# Patient Record
Sex: FEMALE | Race: WHITE | URBAN - METROPOLITAN AREA
[De-identification: names, ages, dates, MRNs, and addresses within clinical notes are randomized per-mention and may not be internally consistent; named-entity substitution may affect disease eponyms.]

---

## 2018-10-02 ENCOUNTER — TELEPHONE (OUTPATIENT)
Dept: ORTHOPEDIC SURGERY | Age: 53
End: 2018-10-02

## 2018-10-02 ENCOUNTER — OFFICE VISIT (OUTPATIENT)
Dept: ORTHOPEDIC SURGERY | Age: 53
End: 2018-10-02
Payer: COMMERCIAL

## 2018-10-02 VITALS
BODY MASS INDEX: 18.83 KG/M2 | WEIGHT: 139 LBS | SYSTOLIC BLOOD PRESSURE: 112 MMHG | HEIGHT: 72 IN | HEART RATE: 79 BPM | DIASTOLIC BLOOD PRESSURE: 77 MMHG

## 2018-10-02 DIAGNOSIS — M25.571 RIGHT ANKLE PAIN, UNSPECIFIED CHRONICITY: Primary | ICD-10-CM

## 2018-10-02 DIAGNOSIS — S93.401A SPRAIN OF RIGHT ANKLE, UNSPECIFIED LIGAMENT, INITIAL ENCOUNTER: ICD-10-CM

## 2018-10-02 PROCEDURE — L4361 PNEUMA/VAC WALK BOOT PRE OTS: HCPCS | Performed by: ORTHOPAEDIC SURGERY

## 2018-10-02 PROCEDURE — 99204 OFFICE O/P NEW MOD 45 MIN: CPT | Performed by: ORTHOPAEDIC SURGERY

## 2018-10-02 RX ORDER — OXYBUTYNIN CHLORIDE 5 MG/1
5 TABLET ORAL 3 TIMES DAILY
COMMUNITY

## 2018-10-02 NOTE — PROGRESS NOTES
Date:  10/2/2018    Name:  Jorge Trejo  Address:  [address] [place] [address]    :  1965      Age:   52 y.o.    SSN:  [id number]      Medical Record Number:  [id number]    Reason for Visit:    Chief Complaint    Injury (np right ankle pain/swelling. injured while hiking today.)      DOS:10/2/2018     HPI: Jorge Trejo is a 52 y.o. female here today for evaluation of right ankle injury. Earlier today she was hiking when she twisted her right ankle. Since that time she's had lateral sided ankle pain with associated swelling. It's difficult for her to weight-bear on her right ankle. There is no numbness or tingling in the right lower extremity. No history of prior traumas. Pain Assessment  Location of Pain: Ankle  Location Modifiers: Right  Severity of Pain: 5  Quality of Pain: Sharp, Dull  Duration of Pain: Persistent  Frequency of Pain: Constant  Date Pain First Started: 10/02/18  Aggravating Factors: Standing (moving it)  Limiting Behavior: Yes  Relieving Factors: Rest  Result of Injury: Yes  Work-Related Injury: No  Are there other pain locations you wish to document?: No  ROS: All systems reviewed on patient intake form. Pertinent items are noted in HPI. Past Medical History:   Diagnosis Date    Arthritis     Thyroid disease         No past surgical history on file. No family history on file.     Social History     Social History    Marital status:      Spouse name: N/A    Number of children: N/A    Years of education: N/A     Social History Main Topics    Smoking status: Never Smoker    Smokeless tobacco: Never Used    Alcohol use Yes    Drug use: No    Sexual activity: Not Asked     Other Topics Concern    None     Social History Narrative    None       Current Outpatient Prescriptions   Medication Sig Dispense Refill    Levothyroxine Sodium (SYNTHROID PO) Take by mouth      Linaclotide (LINZESS PO) Take by mouth      PANTOPRAZOLE SODIUM PO Take by mouth appointment was performed between the patient and Dr. Cort Hashimoto. 10/2/2018 2:02 PM      This dictation was performed with a verbal recognition program (DRAGON) and it was checked for errors. It is possible that there are still dictated errors within this office note. If so, please bring any errors to my attention for an addendum. All efforts were made to ensure that this office note is accurate.

## 2018-10-05 NOTE — PROGRESS NOTES
 DICLOFENAC PO Take by mouth      oxybutynin (DITROPAN) 5 MG tablet Take 5 mg by mouth 3 times daily       No current facility-administered medications for this visit. Allergies   Allergen Reactions    Sulfa Antibiotics Rash       Vital signs:  /77   Pulse 79   Ht 6' (1.829 m)   Wt 139 lb (63 kg)   BMI 18.85 kg/m²        Neuro: Alert & oriented x 3,  normal,  no focal deficits noted. Normal affect. Eyes: sclera clear  Ears: Normal external ear  Mouth:  No perioral lesions  Pulm: Respirations unlabored and regular  Pulse: Regular rate    Skin: Warm, well perfused      RIGHT ankle exam    Gait: No use of assistive devices. No antalgic gait. Inspection/skin: No apparent deformity or abnormality. No significant edema, or ecchymosis. Palpation: Tenderness over lateral malleolus. No medial sided tenderness. No syndesmotic tenderness. Nontender to palpation about the medial malleolus, base of the fifth metatarsal, proximal and distal medial metatarsals, tibial diaphysis. Nontender palpation along the insertion of the Achilles tendon. Range of Motion: Full ROM. Strength: No pain with resisted external rotation. 5 over 5 and symmetric strength in his EHL/FHL/GSC/TA. Effusion: No apparent effusion. Neurologic and vascular: The skin is warm and well perfused throughout. Sensation intact to light touch. Special Tests: Negative squeeze test.      LEFT comparison ankle exam    Gait: No use of assistive devices. No antalgic gait. Inspection/skin: No apparent deformity or abnormality. No significant edema, or ecchymosis. Palpation: Nontender to palpation about the medial andlateral malleolus, base of the fifth metatarsal, proximal and distal medial metatarsals, tibial diaphysis. Nontender palpation along the insertion of the Achilles tendon. Range of Motion: Full ROM. Strength: 5 over 5 and symmetric strength in his EHL/FHL/GSC/TA.       Effusion: No apparent

## 2018-10-12 ENCOUNTER — OFFICE VISIT (OUTPATIENT)
Dept: ORTHOPEDIC SURGERY | Age: 53
End: 2018-10-12
Payer: COMMERCIAL

## 2018-10-12 VITALS
SYSTOLIC BLOOD PRESSURE: 108 MMHG | BODY MASS INDEX: 18.81 KG/M2 | DIASTOLIC BLOOD PRESSURE: 70 MMHG | HEART RATE: 76 BPM | HEIGHT: 72 IN | WEIGHT: 138.89 LBS

## 2018-10-12 DIAGNOSIS — S82.891D CLOSED AVULSION FRACTURE OF RIGHT ANKLE WITH ROUTINE HEALING, SUBSEQUENT ENCOUNTER: Primary | ICD-10-CM

## 2018-10-12 PROCEDURE — 99214 OFFICE O/P EST MOD 30 MIN: CPT | Performed by: ORTHOPAEDIC SURGERY

## 2018-10-12 PROCEDURE — L4350 ANKLE CONTROL ORTHO PRE OTS: HCPCS | Performed by: ORTHOPAEDIC SURGERY

## 2018-10-13 NOTE — PROGRESS NOTES
Chief Complaint  Follow-up (right ankle pain. symptoms improved.)      History of Present Illness:  Farida Carpenter is a pleasant 52 y.o. female with 8 day old right ankle sprain and lateral avulsion fracture. She reports she's been compliant with boot. Her ankle doesn't cause much pain. She feels like she is doing much better. The swelling is improving, but still persists. She also has some bruising. No new injuries reported. Medical History:  Patient's medications, allergies, past medical, surgical, social and family histories were reviewed and updated as appropriate. Pertinent items are noted in HPI  Review of systems reviewed from Patient History Form dated on 10/12/2018 and available in the patient's chart under the Media tab. Vital Signs:  Vitals:    10/12/18 1032   BP: 108/70   Pulse: 76         Neuro: Alert & oriented x 3,  normal,  no focal deficits noted. Normal affect. Eyes: sclera clear  Ears: Normal external ear  Mouth:  No perioral lesions  Pulm: Respirations unlabored and regular  Pulse: Regular rate and rhythm   Skin: Warm, well perfused      Constitutional: In no apparent distress. Normal affect. Alert and oriented X3 and is cooperative. RIGHT ankle exam    Gait: No use of assistive devices. No antalgic gait. Inspection/skin: No apparent deformity or abnormality. Some edema and ecchymosis laterally. Palpation: Tenderness laterally. Nontender to palpation about the medial malleolus, base of the fifth metatarsal, proximal and distal medial metatarsals, tibial diaphysis. Nontender palpation along the insertion of the Achilles tendon. Range of Motion: Full ROM. Strength: good strength with inversion and eversion. Effusion: Mild effusion. Neurologic and vascular: The skin is warm and well perfused throughout. Sensation intact to light touch. LEFT ankle exam    Gait: No use of assistive devices. No antalgic gait.     Inspection/skin: No apparent deformity or abnormality. No significant edema, or ecchymosis. Palpation: Nontender to palpation about the medial and lateral malleolus, base of the fifth metatarsal, proximal and distal medial metatarsals, tibial diaphysis. Nontender palpation along the insertion of the Achilles tendon. Range of Motion: Full ROM. Strength: 5 over 5 and symmetric strength with eversion and inversion. Effusion: No apparent effusion. Neurologic and vascular: The skin is warm and well perfused throughout. Sensation intact to light touch. Radiology:     Radiographs were obtained and reviewed in the office; 3 views of right ankle: right mortise, right AP, right lateral    Impression: routinely healing closed, distal fibular tip avulsion fracture         Assessment :  49yo female with healing right ankle sprain and lateral avulsion fracture. Impression:  Encounter Diagnosis   Name Primary?  Closed avulsion fracture of right ankle with routine healing, subsequent encounter Yes       Office Procedures:  Orders Placed This Encounter   Procedures    XR ANKLE RIGHT (MIN 3 VIEWS)    DJO Air-Stirrup Ankle Brace     Patient was prescribed a DJO Air Stirrup Ankle Brace. The right ankle will require stabilization / immobilization from this semi-rigid / rigid orthosis to improve their function. The orthosis will assist in protecting the affected area, provide functional support and facilitate healing. Patient was instructed to progress ambulation weight bearing as tolerated in the device. The patient was educated and fit by a healthcare professional with expert knowledge and specialization in brace application while under the direct supervision of the treating physician. Verbal and written instructions for the use of and application of this item were provided.    They were instructed to contact the office immediately should the brace result in increased pain, decreased sensation, increased swelling or worsening of the condition. Plan: She may discontinue the boot (using only when she feels necessary on uneven surfaces), was fitted with a u-splint today, and would like her to start home exercises. Home exercises for ankle sprain were provided. If no setbacks, no new x-rays needed at next followup. Followup in 3-4 weeks and/or as needed. If she feels she needs a more hands on approach to therapy we can offer her formal supervised physical therapy. Blayne Cifuentes is in agreement with this plan. All questions were answered to patient's satisfaction and was encouraged to call with any further questions. Irlanda [address], PA-C  10/13/2018       During this examination, I, Irlanda [address], PA-C, functioned as a scribe for Dr. Mao Ramos. The history taking and physical examination were performed by Dr. Mao Ramos. All counseling during the appointment was performed between the patient and Dr. Mao Ramos. 10/13/2018 10:17 AM      This dictation was performed with a verbal recognition program (DRAGON) and it was checked for errors. It is possible that there are still dictated errors within this office note. If so, please bring any errors to my attention for an addendum. All efforts were made to ensure that this office note is accurate. Greater than 50% of the visit was spent counseling the patient. I personally reviewed the patient's pain scale, review of systems, family history, social history, past medical history, allergies and medications. 13 point review of systems was collected and reviewed today. It is noncontributory. I personally performed the services described in this documentation and scribed by [address] [place] [address] Avenue PA-C. Filemon Wild MD  Sports Medicine, Arthroscopic Knee and Shoulder Surgery    This dictation was performed with a verbal recognition program [facility]) and it was checked for errors. It is possible that there are still dictated errors within this office note.   If so, please

## 2018-10-26 ENCOUNTER — OFFICE VISIT (OUTPATIENT)
Dept: ORTHOPEDIC SURGERY | Age: 53
End: 2018-10-26
Payer: COMMERCIAL

## 2018-10-26 VITALS
SYSTOLIC BLOOD PRESSURE: 122 MMHG | HEIGHT: 72 IN | WEIGHT: 138 LBS | BODY MASS INDEX: 18.69 KG/M2 | DIASTOLIC BLOOD PRESSURE: 72 MMHG | HEART RATE: 81 BPM

## 2018-10-26 DIAGNOSIS — S82.891D CLOSED AVULSION FRACTURE OF RIGHT ANKLE WITH ROUTINE HEALING, SUBSEQUENT ENCOUNTER: Primary | ICD-10-CM

## 2018-10-26 PROCEDURE — 99213 OFFICE O/P EST LOW 20 MIN: CPT | Performed by: ORTHOPAEDIC SURGERY

## 2018-10-27 NOTE — PROGRESS NOTES
No antalgic gait. Inspection/skin: No apparent deformity or abnormality. No significant edema, or ecchymosis. Palpation: Nontender to palpation about the medial andlateral malleolus, base of the fifth metatarsal, proximal and distal medial metatarsals, tibial diaphysis. Nontender palpation along the insertion of the Achilles tendon. Range of Motion: Full ROM. Strength: 5 over 5 and symmetric with eversion and inversion. Effusion: No apparent effusion. Neurologic and vascular: The skin is warm and well perfused throughout. Sensation intact to light touch        Radiology:     No new XR obtained today         Assessment :  47yo female with routinely healing right ankle sprain and lateral avulsion fracture. Impression:  Encounter Diagnosis   Name Primary?  Closed avulsion fracture of right ankle with routine healing, subsequent encounter Yes       Office Procedures:  No orders of the defined types were placed in this encounter. Plan: Continue using usplint throughout her entire trip. Follow up as needed. Krystle Portillo is in agreement with this plan. All questions were answered to patient's satisfaction and was encouraged to call with any further questions. Lurdes Weiss PA-C  10/27/2018       During this examination, I, Lurdes Weiss PA-C, functioned as a scribe for Dr. Denia Faustin. The history taking and physical examination were performed by Dr. Denia Faustin. All counseling during the appointment was performed between the patient and Dr. Denia Faustin. 10/27/2018 11:37 AM      This dictation was performed with a verbal recognition program (DRAGON) and it was checked for errors. It is possible that there are still dictated errors within this office note. If so, please bring any errors to my attention for an addendum. All efforts were made to ensure that this office note is accurate. Greater than 50% of the visit was spent counseling the patient.     I personally